# Patient Record
Sex: MALE | Race: WHITE | NOT HISPANIC OR LATINO | URBAN - METROPOLITAN AREA
[De-identification: names, ages, dates, MRNs, and addresses within clinical notes are randomized per-mention and may not be internally consistent; named-entity substitution may affect disease eponyms.]

---

## 2020-03-20 ENCOUNTER — EMERGENCY (EMERGENCY)
Facility: HOSPITAL | Age: 50
LOS: 1 days | Discharge: HOME | End: 2020-03-20
Admitting: EMERGENCY MEDICINE
Payer: SELF-PAY

## 2020-03-20 VITALS
RESPIRATION RATE: 17 BRPM | TEMPERATURE: 98 F | DIASTOLIC BLOOD PRESSURE: 98 MMHG | HEART RATE: 70 BPM | WEIGHT: 199.96 LBS | SYSTOLIC BLOOD PRESSURE: 143 MMHG | OXYGEN SATURATION: 97 %

## 2020-03-20 DIAGNOSIS — R05 COUGH: ICD-10-CM

## 2020-03-20 DIAGNOSIS — J06.9 ACUTE UPPER RESPIRATORY INFECTION, UNSPECIFIED: ICD-10-CM

## 2020-03-20 LAB — RAPID RVP RESULT: SIGNIFICANT CHANGE UP

## 2020-03-20 PROCEDURE — 87581 M.PNEUMON DNA AMP PROBE: CPT

## 2020-03-20 PROCEDURE — 99284 EMERGENCY DEPT VISIT MOD MDM: CPT

## 2020-03-20 PROCEDURE — 87635 SARS-COV-2 COVID-19 AMP PRB: CPT

## 2020-03-20 PROCEDURE — 87486 CHLMYD PNEUM DNA AMP PROBE: CPT

## 2020-03-20 PROCEDURE — 99283 EMERGENCY DEPT VISIT LOW MDM: CPT

## 2020-03-20 PROCEDURE — 87798 DETECT AGENT NOS DNA AMP: CPT

## 2020-03-20 PROCEDURE — 87633 RESP VIRUS 12-25 TARGETS: CPT

## 2020-03-20 NOTE — ED ADULT NURSE NOTE - OBJECTIVE STATEMENT
Patient is a 49yo male reporting feeling "run down" over past 2 days with dry cough starting last night. Denies chest pain, shortness of breath, abdominal pain, n/v/d, fevers. Speaking clearly in full sentences.

## 2020-03-20 NOTE — ED PROVIDER NOTE - PATIENT PORTAL LINK FT
You can access the FollowMyHealth Patient Portal offered by [facility] by registering at the following website: [email or web address]. By joining [employer]’s FollowMyHealth portal, you will also be able to view your health information using other applications (apps) compatible with our system.

## 2020-03-20 NOTE — ED PROVIDER NOTE - NSFOLLOWUPINSTRUCTIONS_ED_ALL_ED_FT
Follow up with your physician. Please self isolate for the next 14 days and refer to the COVID discharge instructions provided.         Upper Respiratory Infection    WHAT YOU NEED TO KNOW:    An upper respiratory infection is also called the common cold. It is an infection that can affect your nose, throat, ears, and sinuses. For healthy people, the common cold is usually not serious and does not need special treatment. Cold symptoms are usually worst for the first 3 to 5 days. Most people get better in 7 to 14 days. You may continue to cough for 2 to 3 weeks. Colds are caused by viruses and do not get better with antibiotics.     DISCHARGE INSTRUCTIONS:    Return to the emergency department if:     You have chest pain or trouble breathing.        Contact your healthcare provider if:     You have a fever over 102ºF (39°C).      Your sore throat gets worse or you see white or yellow spots in your throat.      Your symptoms get worse after 3 to 5 days or your cold is not better in 14 days.      You have a rash anywhere on your skin.      You have large, tender lumps in your neck.      You have thick, green or yellow drainage from your nose.      You cough up thick yellow, green, or bloody mucus.      You have vomiting for more than 24 hours and cannot keep fluids down.      You have a bad earache.      You have questions or concerns about your condition or care.    Medicines: You may need any of the following:     Decongestants help reduce nasal congestion and help you breathe more easily. If you take decongestant pills, they may make you feel restless or cause problems with your sleep. Do not use decongestant sprays for more than a few days.      Cough suppressants help reduce coughing. Ask your healthcare provider which type of cough medicine is best for you.       NSAIDs, such as ibuprofen, help decrease swelling, pain, and fever. NSAIDs can cause stomach bleeding or kidney problems in certain people. If you take blood thinner medicine, always ask your healthcare provider if NSAIDs are safe for you. Always read the medicine label and follow directions.      Acetaminophen decreases pain and fever. It is available without a doctor's order. Ask how much to take and how often to take it. Follow directions. Read the labels of all other medicines you are using to see if they also contain acetaminophen, or ask your doctor or pharmacist. Acetaminophen can cause liver damage if not taken correctly. Do not use more than 4 grams (4,000 milligrams) total of acetaminophen in one day.       Take your medicine as directed. Contact your healthcare provider if you think your medicine is not helping or if you have side effects. Tell him or her if you are allergic to any medicine. Keep a list of the medicines, vitamins, and herbs you take. Include the amounts, and when and why you take them. Bring the list or the pill bottles to follow-up visits. Carry your medicine list with you in case of an emergency.    Follow up with your healthcare provider as directed: Write down your questions so you remember to ask them during your visits.     Self-care:     Rest as much as possible. Slowly start to do more each day.       Drink more liquids as directed. Liquids will help thin and loosen mucus so you can cough it up. Liquids will also help prevent dehydration. Liquids that help prevent dehydration include water, fruit juice, and broth. Do not drink liquids that contain caffeine. Caffeine can increase your risk for dehydration. Ask your healthcare provider how much liquid to drink each day.       Soothe a sore throat. Gargle with warm salt water. This helps your sore throat feel better. Make salt water by dissolving ¼ teaspoon salt in 1 cup warm water. You may also suck on hard candy or throat lozenges. You may use a sore throat spray.      Use a humidifier or vaporizer. Use a cool mist humidifier or a vaporizer to increase air moisture in your home. This may make it easier for you to breathe and help decrease your cough.       Use saline nasal drops as directed. These help relieve congestion.       Apply petroleum-based jelly around the outside of your nostrils. This can decrease irritation from blowing your nose.       Do not smoke. Nicotine and other chemicals in cigarettes and cigars can make your symptoms worse. They can also cause infections such as bronchitis or pneumonia. Ask your healthcare provider for information if you currently smoke and need help to quit. E-cigarettes or smokeless tobacco still contain nicotine. Talk to your healthcare provider before you use these products.     Prevent spreading your cold to others:     Try to stay away from other people during the first 2 to 3 days of your cold when it is more easily spread.       Do not share food or drinks.       Do not share hand towels with household members.      Wash your hands often, especially after you blow your nose. Turn away from other people and cover your mouth and nose with a tissue when you sneeze or cough.Handwashing              © Copyright [employer] 2020       back to top                      © Copyright [employer] 2020

## 2020-03-20 NOTE — ED PROVIDER NOTE - OBJECTIVE STATEMENT
49 y/o male with no sig PMHx c/o non productive cough x 1 day. pt states bodyache and chills. no fever. no cp, sob or wheezing. no abd pain, n/v/d .no ha or dizziness. no sick contacts. no recent travel. no further complaints.

## 2020-03-20 NOTE — ED ADULT TRIAGE NOTE - CHIEF COMPLAINT QUOTE
c/o body aches, cough, chills that started last night.  Denies known exposure to +covid cases.  Mask applied in triage.  Patient well-appearing, speaking clearly in full sentences

## 2020-03-20 NOTE — ED PROVIDER NOTE - CLINICAL SUMMARY MEDICAL DECISION MAKING FREE TEXT BOX
cough and congestion concerning for COVID. lungs clear on exam. VSS. COVID testing and RVP sent. pt instructed in self isolation. Covid instructions given to pt. f/u with pmd. return precautions d/w pt.

## 2020-03-22 LAB — SARS-COV-2 RNA SPEC QL NAA+PROBE: SIGNIFICANT CHANGE UP

## 2024-01-17 NOTE — ED ADULT TRIAGE NOTE - NS ED TRIAGE AVPU SCALE
Temp report from Renate FORRESTER  Pt resting on cot  Awaiting US to call   Alert-The patient is alert, awake and responds to voice. The patient is oriented to time, place, and person. The triage nurse is able to obtain subjective information.